# Patient Record
Sex: MALE | Race: WHITE
[De-identification: names, ages, dates, MRNs, and addresses within clinical notes are randomized per-mention and may not be internally consistent; named-entity substitution may affect disease eponyms.]

---

## 2019-09-27 ENCOUNTER — HOSPITAL ENCOUNTER (EMERGENCY)
Dept: HOSPITAL 62 - ER | Age: 49
LOS: 1 days | Discharge: HOME | End: 2019-09-28
Payer: COMMERCIAL
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
## 2019-09-27 DIAGNOSIS — T16.1XXA: Primary | ICD-10-CM

## 2019-09-27 PROCEDURE — 99282 EMERGENCY DEPT VISIT SF MDM: CPT

## 2019-09-28 VITALS — DIASTOLIC BLOOD PRESSURE: 105 MMHG | SYSTOLIC BLOOD PRESSURE: 153 MMHG

## 2019-09-28 NOTE — ER DOCUMENT REPORT
HPI





- HPI


Time Seen by Provider: 09/28/19 00:00


Context: 


Patient is a 49-year-old male that comes to the emergency department for chief 

complaint of a bug in his right ear.  He states it was a flying insect that flew

into his ear while he was sitting on a bleacher.  He states the flapping and l

oud noise was driving him crazy so he did have his wife for vegetable oil in his

ear and he thinks it killed it because he has not felt it moving or flapping 

since that time.  This was about 2 hours prior to my evaluation.  Patient denies

bleeding from the ear, current pain in the ear, nausea or vomiting, headache, or

any other complaints.





Past Medical History





- General


Information source: Patient





- Social History


Smoking Status: Never Smoker


Frequency of alcohol use: None


Drug Abuse: None


Lives with: Family


Family History: Reviewed & Not Pertinent





- Immunizations


Hx Diphtheria, Pertussis, Tetanus Vaccination: Yes





Vertical Provider Document





- CONSTITUTIONAL


General Appearance: WD/WN, No Apparent Distress





- HEENT


HEENT: Atraumatic, Normocephalic, PERRLA.  negative: Conjuctival Injection, 

Dental Injury, Normal ENT Exam - Left ear is normal.  Right ear shows what 

appears to be a small amount of oil in the ear canal and there does appear to be

a winged insect curled up close to the tympanic membrane.  There is no bleeding,

tragus is nontender, unremarkable otherwise., Pharyngeal Exudate, Pharyngeal 

Tenderness, Pharyngeal Erythema, Tympanic Membrane Red, Tympanic Membrane 

Bulging





- NECK


Neck: Normal Inspection





- RESPIRATORY


Respiratory: Breath Sounds Normal, No Respiratory Distress





- CARDIOVASCULAR


Cardiovascular: Regular Rate, Regular Rhythm





- GI/ABDOMEN


Gastrointestinal: Abdomen Soft, Abdomen Non-Tender





- BACK


Back: Normal Inspection





- MUSCULOSKELETAL/EXTREMETIES


Musculoskeletal/Extremeties: MAEW, FROM, Non-Tender





- NEURO


Level of Consciousness: Awake, Alert, Appropriate


Motor/Sensory: No Motor Deficit, No Sensory Deficit





- DERM


Integumentary: Warm, No Rash





Course





- Re-evaluation


Re-evalutation: 


Insect removed from right ear without complication.  Discussed recommendations, 

follow-up, and return precautions.  Patient states understanding and agreement.





- Vital Signs


Vital signs: 


                                        











Temp Pulse Resp BP Pulse Ox


 


 98.2 F   87   16   164/120 H  97 


 


 09/27/19 21:53  09/27/19 21:53  09/27/19 21:53  09/27/19 21:53  09/27/19 21:53














Procedures





- Additional Procedures


  ** right ear insect removal


Additional Procedures: Other - Right ear was irrigated with warm water, this did

bring oil out and the winged insect did approach the middle canal, after this 

was easily grasped with alligator forceps.  It came out in 3 pieces.  There were

2 tiny pieces that looked like insect particles retained near the eardrum, ear 

was irrigated additionally but these would not move.  Eardrum is normal.  No 

bleeding.  No complications.





Discharge





- Discharge


Clinical Impression: 


Ear foreign body


Qualifiers:


 Encounter type: initial encounter Laterality: right Qualified Code(s): T16.1XXA

- Foreign body in right ear, initial encounter





Condition: Stable


Disposition: HOME, SELF-CARE


Additional Instructions: 


The insect has been removed from the right ear.  There are tiny fragments of the

insect still in the ear on the sides, these will come out with time in the 

shower/bath.  You can put drying agent such as hydrogen peroxide or isopropyl 

alcohol in your ear (ex: several drops a 2 times a day for the next 2 days). 





Follow-up with primary care.  Return for any concerning symptoms including 

developing swelling, discolored discharge, severe pain, or any other concerning 

symptoms.


Forms:  Elevated Blood Pressure